# Patient Record
Sex: MALE | Race: WHITE
[De-identification: names, ages, dates, MRNs, and addresses within clinical notes are randomized per-mention and may not be internally consistent; named-entity substitution may affect disease eponyms.]

---

## 2020-10-10 ENCOUNTER — HOSPITAL ENCOUNTER (EMERGENCY)
Dept: HOSPITAL 60 - LB.ED | Age: 55
Discharge: HOME | End: 2020-10-10
Payer: COMMERCIAL

## 2020-10-10 DIAGNOSIS — S60.451A: Primary | ICD-10-CM

## 2020-10-10 DIAGNOSIS — I10: ICD-10-CM

## 2020-10-10 DIAGNOSIS — Z79.899: ICD-10-CM
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
## 2020-10-10 NOTE — EDM.PDOC
ED HPI GENERAL MEDICAL PROBLEM





- General


Chief Complaint: Upper Extremity Injury/Pain


Stated Complaint: hook infinger


Time Seen by Provider: 10/10/20 17:15


Source of Information: Reports: Patient


History Limitations: Reports: No Limitations





- History of Present Illness


INITIAL COMMENTS - FREE TEXT/NARRATIVE: 





Patient is a 54 y/o male who presents with left index finger embedded fish hook 

that happened prior to arrival.  Tetanus is up-to-date.  


  ** Left Finger-Index


Pain Score (Numeric/FACES): 7





- Related Data


                                    Allergies











Allergy/AdvReac Type Severity Reaction Status Date / Time


 


No Known Allergies Allergy   Verified 10/10/20 17:24











Home Meds: 


                                    Home Meds





lisinopriL [Lisinopril] 1 tab PO DAILY 10/10/20 [History]











Past Medical History


Cardiovascular History: Reports: Hypertension





- Past Surgical History


Musculoskeletal Surgical History: Reports: Arthroscopic Knee





Social & Family History





- Tobacco Use


Smoking Status *Q: Never Smoker





Review of Systems





- Review of Systems


Review Of Systems: See Below


Constitutional: Reports: No Symptoms


Skin: Reports: Wound, Other (fish hooks to left index finger)





ED EXAM, GENERAL





- Physical Exam


Exam: See Below


Exam Limited By: No Limitations


General Appearance: Alert, No Apparent Distress


Head: Atraumatic, Normocephalic


Respiratory/Chest: No Respiratory Distress, No Accessory Muscle Use


Neurological: Alert, Oriented, CN II-XII Intact, Normal Cognition, Normal Gait


Skin Exam: Warm, Dry, Intact, Other (large fish hook embedded into left index 

finger, sparing the finger nail)





ED TRAUMA EXTREMITY PROCEDURES





- Foreign Body Removal


Consent Obtained: Patient


Foreign Body Other Location Comment:: fish hook to left finger


Anesthesia Type: Other (see below) (digital block)


Anesthesia Other:: 





digital block of 2% lidocaine used (3 cc)


Complications:: No





Course





- Vital Signs


Last Recorded V/S: 


                                Last Vital Signs











Temp  36.8 C   10/10/20 17:15


 


Pulse  78   10/10/20 17:15


 


Resp  16   10/10/20 17:15


 


BP  168/111 H  10/10/20 17:15


 


Pulse Ox  95   10/10/20 17:15














Departure





- Departure


Time of Disposition: 17:40


Disposition: Home, Self-Care 01


Condition: Good


Clinical Impression: 


Fish hook injury of finger of left hand


Qualifiers:


 Encounter type: initial encounter Qualified Code(s): S69.92XA - Unspecified 

injury of left wrist, hand and finger(s), initial encounter








- Discharge Information


*PRESCRIPTION DRUG MONITORING PROGRAM REVIEWED*: Not Applicable


*COPY OF PRESCRIPTION DRUG MONITORING REPORT IN PATIENT JILL: Not Applicable


Instructions:  Puncture Wound


Forms:  ED Department Discharge, ED Return to Work/School Form


Additional Instructions: 


Watch for signs and symptoms of infection, redness, swelling or pus


Return to primary provider if needed


Can take tylenol, alternate Ibuprofen for discomfort


Keep index finger clean and dry


Change bandage as needed





Sepsis Event Note (ED)





- Evaluation


Sepsis Screening Result: No Definite Risk





- Focused Exam


Vital Signs: 


                                   Vital Signs











  Temp Pulse Resp BP Pulse Ox


 


 10/10/20 17:15  36.8 C  78  16  168/111 H  95